# Patient Record
Sex: FEMALE | Race: WHITE | NOT HISPANIC OR LATINO | URBAN - NONMETROPOLITAN AREA
[De-identification: names, ages, dates, MRNs, and addresses within clinical notes are randomized per-mention and may not be internally consistent; named-entity substitution may affect disease eponyms.]

---

## 2017-03-21 PROBLEM — E10.3293: Noted: 2017-03-21

## 2019-01-14 ENCOUNTER — IMPORTED ENCOUNTER (OUTPATIENT)
Dept: URBAN - NONMETROPOLITAN AREA CLINIC 1 | Facility: CLINIC | Age: 34
End: 2019-01-14

## 2019-01-14 PROCEDURE — S0621 ROUTINE OPHTHALMOLOGICAL EXA: HCPCS

## 2019-01-14 NOTE — PATIENT DISCUSSION
IDDM x 21 years - moderate BDR. Stressed importance of good control and streseed importance of seeing PCP. Letter. 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

## 2019-01-17 NOTE — PATIENT DISCUSSION
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 Will plan for pneumatic retinpexy with C3f8. Patient understands 50% of needing further surgery.  Risks and benefits  Hand motion @ 5:57 PM.

## 2019-01-17 NOTE — PROCEDURE NOTE: CLINICAL
PROCEDURE NOTE: Pneumatic Retinopexy OS. Prior to procedure, risks/benefits/alternatives discussed including infection, loss of vision, hemorrhage, cataract, glaucoma, retinal tears or detachment and patient wished to proceed. Betadine prep was performed. Intravitreal injection of 0.3 ml of 100% C3F8 given 4mm from limbus in ST quadrant. Patient tolerated procedure well. There were no complications. Post-op instructions given. Patient given office phone number/answering service number and advised to call immediately should there be an increase in floaters or redness, loss of vision or pain, or should they have any other questions or concerns. Cade Escobar

## 2019-01-17 NOTE — PATIENT DISCUSSION
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 This possibility had been discussed prior to the initial surgery. Options from observation, surgery. pneumatic and second opinion discussed. Risks including not limited to infection, bleeding, glaucoma, cataract, IOL damage, PVR, retinal scars, multiple surgeries, chronic pain, cosmetic changes, decreased ability to perform daily activities causing personal/professional damage, loss of vision/eye discussed. Recommended pneumatic to slow the progression of RD and possibly resolve RD. The possibility of multiple surgeries emphasized. Risks of infection, bleeding, tear, detachment, ocular irritations and cosmetic changes among others discussed. Thorough hygiene and antibiotic use discussed. Call for any changes. Required head positioned discussed.

## 2019-01-18 NOTE — PROCEDURE NOTE: CLINICAL
PROCEDURE NOTE: Repair of Retinal Detachment, 1 or More Sessions OS. Diagnosis: Retinal Detachment with Giant Retinal Tear. Prior to laser, risks/benefits/alternatives to laser discussed including loss of vision, decreased peripheral and night vision, need for more laser and/or surgery and patient wished to proceed. A written consent is on file, and the need for today’s laser was discussed and the patient is understanding and wishes to proceed. Laser Lens: *. Wavelength: Argon Green. Spot size: * um. Pulse power: * mW. Number of pulses: *. Patient tolerated procedure well. There were no complications. Post-op instructions given. Patient given office phone number/answering service number and advised to call immediately should there be loss of vision or pain, or should they have any other questions or concerns. Nico Quach

## 2019-01-21 NOTE — PROCEDURE NOTE: CLINICAL
PROCEDURE NOTE: Repair of Retinal Detachment, 1 or More Sessions OS. Diagnosis: Retinal Detachment with Giant Retinal Tear. Prior to laser, risks/benefits/alternatives to laser discussed including loss of vision, decreased peripheral and night vision, need for more laser and/or surgery and patient wished to proceed. A written consent is on file, and the need for today’s laser was discussed and the patient is understanding and wishes to proceed. Laser Lens: SUPER QUAD. Wavelength: Argon Green. Spot size: 200 um. Pulse power: 340 mW. Number of pulses: 30. Patient tolerated procedure well. There were no complications. Post-op instructions given. Patient given office phone number/answering service number and advised to call immediately should there be loss of vision or pain, or should they have any other questions or concerns. Lizeth Guzman

## 2019-01-23 NOTE — PROCEDURE NOTE: CLINICAL
PROCEDURE NOTE: Repair of Retinal Detachment, 1 or More Sessions OS. Diagnosis: Retinal Detachment with Giant Retinal Tear. Prior to laser, risks/benefits/alternatives to laser discussed including loss of vision, decreased peripheral and night vision, need for more laser and/or surgery and patient wished to proceed. A written consent is on file, and the need for today’s laser was discussed and the patient is understanding and wishes to proceed. Laser Lens:  super quad. Wavelength: Argon Green. Spot size: 100 um. Pulse power: 400mW. Number of pulses: 30. Patient tolerated procedure well. There were no complications. Post-op instructions given. Patient given office phone number/answering service number and advised to call immediately should there be loss of vision or pain, or should they have any other questions or concerns. Saad Walters

## 2019-01-23 NOTE — PATIENT DISCUSSION
Recommend additional laser today.  Sample of Durezol given -use tid times 3 days then stop.  Follow up on Friday  morning.

## 2019-01-30 NOTE — PROCEDURE NOTE: CLINICAL
PROCEDURE NOTE: Repair of Retinal Detachment, 1 or More Sessions OS. Diagnosis: Retinal Detachment with Giant Retinal Tear. Prior to laser, risks/benefits/alternatives to laser discussed including loss of vision, decreased peripheral and night vision, need for more laser and/or surgery and patient wished to proceed. A written consent is on file, and the need for today’s laser was discussed and the patient is understanding and wishes to proceed. Laser Lens: superquad. Wavelength: Argon Green. Spot size: 100 um. Pulse power: 340 mW. Number of pulses: 30. Patient tolerated procedure well. There were no complications. Post-op instructions given. Patient given office phone number/answering service number and advised to call immediately should there be loss of vision or pain, or should they have any other questions or concerns. Anthony Belle

## 2019-02-04 NOTE — PATIENT DISCUSSION
10% gas fill today. Pt to continue sleeping on right side. No exercising for one more week. Continue Durezol tid until runs out. Then start Pred.

## 2019-02-18 NOTE — PATIENT DISCUSSION
5% gas fill today.  Continue and taper pred tid for 2 weeks bid for 2 weeks qday for 2 wks, then stop. No new tears seen on examination today.

## 2019-03-04 NOTE — PATIENT DISCUSSION
Continue Maxitrol QID until it runs out then use PRED qid. Keep head down for 4 more days then pt can sleep on right side, continue patch at night for 4 more days. May return to work in one week.

## 2019-03-18 NOTE — PATIENT DISCUSSION
Patient advised scar tissue present, this needs to be removed.  Will refer patient back to Dr. Savana Cruz for further surgery to remove this.

## 2019-03-18 NOTE — PATIENT DISCUSSION
Recurrent inferior RD with PVR, will need recurrent PPV/MP and possible silicone OIL. Will have patient see Dr Timo Mcghee.

## 2019-04-30 NOTE — PATIENT DISCUSSION
Recurrent inferior RD with PVR, will need recurrent PPV/MP and possible silicone OIL. Will have patient see Dr Vivienne Randolph.

## 2019-04-30 NOTE — PATIENT DISCUSSION
Patient advised scar tissue present, this needs to be removed.  Will refer patient back to Dr. Jake Figueroa for further surgery to remove this.

## 2019-05-13 NOTE — PATIENT DISCUSSION
"  ----- Message -----     From: Lisbeth Contreras     Sent: 3/21/2017  11:36 AM       To: [facility] Clinical Support Staff    Asking for a call back at [phone], regarding names for caregiver    SW received above message and contacted pt back. Pt states has a few people who are willing to alternate care for pt. Upon SW asking if persons are able to take off of work for a certain length of time pt stated "I don't know." Pt states to not having a detailed idea of how caregivers would coordinate and states has yet to speak with all caregivers.     SW recommended to pt that she formulate a detailed plan of caregiver names and days or lengths of time they believe each of them will be able to get off of work.    Pt states will gather a meeting with everyone and bring a calendar in order to formulate a plan for the three months post transplant.     Pt states mother is still primary caregiver. Pt still needing caregiver plan in place. SW remains available.   " Recommended artificial tears to use: 1 drop 4x a day in both eyes.

## 2019-05-13 NOTE — PATIENT DISCUSSION
Significantly increased over past two weeks, will need PPV/MP, will have pt F/U with Dr. Amaury Georges.

## 2019-09-09 NOTE — PATIENT DISCUSSION
Significantly increased over past two weeks, will need PPV/MP, will have pt F/U with Dr. Josefa Guaman.

## 2019-10-01 NOTE — PATIENT DISCUSSION
Significantly increased over past two weeks, will need PPV/MP, will have pt F/U with Dr. Bisi Canales.

## 2019-12-03 NOTE — PATIENT DISCUSSION
Significantly increased over past two weeks, will need PPV/MP, will have pt F/U with Dr. Gabriel Márquez.

## 2019-12-16 NOTE — PATIENT DISCUSSION
Patient educated they are not a candidate for [facility] because of the previous retina surgeries and the wrinkle on the retina.

## 2019-12-16 NOTE — PATIENT DISCUSSION
Lysis of synechiae with surgery. Explained to the patient that this may be the cause of the changes to his pupil. Pupil will hopefully become more stable after surgery.

## 2019-12-16 NOTE — PATIENT DISCUSSION
The patient was informed that with [address] for distance, they will need glasses for all near and intermediate activities after surgery. The patient understands there is a possibility they may need an enhancement after surgery. The patient understands that [address] will give him his personal best vision in the distance but that still will not be perfect. We won't fully know what the eye is capable of until after surgery. The patient elects [facility] OS, goal of emmetropia.

## 2019-12-16 NOTE — PATIENT DISCUSSION
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 Slow taper of PredForte after surgery in order to minimize CME. Patient understands vision will still be limited after cataract surgery.

## 2019-12-16 NOTE — PATIENT DISCUSSION
Patient understands this may be related to the previous retina surgery.  Optic nerve appears normal.

## 2019-12-16 NOTE — PATIENT DISCUSSION
Diamox with cataract surgery. 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

## 2020-01-15 NOTE — PATIENT DISCUSSION
will not taper PMN past BID at RTC with KMS next visit will likely change over to PA and slow taper.  watch IOP possible Hx of steroid response.  will stay ON Cgan BID OS at this point.

## 2020-02-05 NOTE — PATIENT DISCUSSION
2/5/20 may have small amount of cystic spaces in parafoveal region but [facility] better vs OCT from 12/2019.  CPM and re-check in one month if CME any worse will have pt see retina for further evaluation.  will take Alrex and Ilevro QD OS until RTC once PMN is gone.  may want to cont to Tx with topicals around YAG period as well.

## 2020-02-05 NOTE — PATIENT DISCUSSION
Discussed the risks/benefits of YAG Laser Capsulotomy.  ed will need this after we check next month for retinal stability.  Ed MAY improve VA OS slightly but only to to level retina will allow (pt understands this).

## 2020-02-05 NOTE — PATIENT DISCUSSION
pt wishes keep care at [address] with KMS due to distance to [facility].

## 2020-03-04 NOTE — PATIENT DISCUSSION
pt wishes keep care at [address] [facility]  with KMS due to distance to [facility].

## 2020-03-04 NOTE — PATIENT DISCUSSION
2/5/20 may have small amount of cystic spaces in parafoveal region but [facility] better vs OCT from 12/2019.  CPM and re-check in one month if CME any worse will have pt see retina for further evaluation.  will take Alrex and Ilevro QD OS until RTC once PMN is gone.  may want to cont to Tx with topicals around YAG period as well.

## 2020-03-04 NOTE — PATIENT DISCUSSION
3/4/20: temvidal JEROME Cgan.  OS inflammed residual need to Tx inflammation with PA QID so IOP comes back up.  will need YAG once inflammation stable.

## 2020-03-13 NOTE — PATIENT DISCUSSION
pt wishes keep care at [address] [facility]  with KMS due to distance to [facility].

## 2020-03-13 NOTE — PATIENT DISCUSSION
2/5/20 may have small amount of cystic spaces in parafoveal region but [facility] better vs OCT from 12/2019.  CPM and re-check in one month if CME any worse will have pt see retina for further evaluation.  will take Alrex and Ilevro QD OS until RTC once PMN is gone.  may want to cont to Tx with topicals around YAG period as well.

## 2020-03-26 NOTE — PATIENT DISCUSSION
pt wishes keep care at [address] [facility]  with KMS due to distance to [facility].

## 2020-03-26 NOTE — PATIENT DISCUSSION
3/26/2020: stable but for now don't dec PA below BID per KMS as I want to keep inflammation down through getting YAG OS completed.  Also cont with Ilevro QD until gone. add GenTeal gel at bed.

## 2020-03-26 NOTE — PATIENT DISCUSSION
2/5/20 may have small amount of cystic spaces in parafoveal region but [facility] better vs OCT from 12/2019.  CPM and re-check in one month if CME any worse will have pt see retina for further evaluation.  will take Alrex and Ilevro QD OS until RTC once PMN is gone.  may want to cont to Tx with topicals around YAG period as well.

## 2020-05-12 NOTE — PATIENT DISCUSSION
pt wishes keep care at [address] [facility]  with KMS due to distance to [facility].

## 2020-05-12 NOTE — PATIENT DISCUSSION
2/5/20 may have small amount of cystic spaces in parafoveal region but [facility] better vs OCT from 12/2019.  CPM and re-check in one month if CME any worse will have pt see retina for further evaluation.  will take Alrex and Ilevro QD OS until RTC once PMN is gone.  may want to cont to Tx with topicals around YAG period as well.

## 2020-05-26 NOTE — PROCEDURE NOTE: SURGICAL
<p>Prior to commencing surgery patient identification, surgical procedure, site, and side were confirmed by Dr. Khushbu Lopez. Following topical proparacaine anesthesia, the patient was positioned at the YAG laser, a contact lens coupled to the cornea with methylcellulose and an axial posterior capsulotomy performed without complication using 3.8 Mj x 19. Excess methylcellulose was washed from the eye, one drop of Alphagan was instilled and the patient returned to the holding area having tolerated the procedure well and without complication. </p><p>MRN#[id number]</p>

## 2020-05-26 NOTE — PATIENT DISCUSSION
pt wishes keep care at [address] [facility]  with KMS due to distance to [facility].

## 2020-05-26 NOTE — PATIENT DISCUSSION
pt wishes keep care at [address] [facility]  with KMS due to distance to [facility].

## 2020-06-01 NOTE — PATIENT DISCUSSION
pt wishes keep care at [address] [facility]  with KMS due to distance to [facility].

## 2020-06-01 NOTE — PATIENT DISCUSSION
Well healed.  mild AC inflammation.  increase PA QID for 4 days then resume BID until RTC. cont Ilevro QD sample given.

## 2020-06-25 ENCOUNTER — IMPORTED ENCOUNTER (OUTPATIENT)
Dept: URBAN - NONMETROPOLITAN AREA CLINIC 1 | Facility: CLINIC | Age: 35
End: 2020-06-25

## 2020-06-25 PROBLEM — E10.3211: Noted: 2020-06-25

## 2020-06-25 PROBLEM — E10.3313: Noted: 2020-06-25

## 2020-06-25 PROCEDURE — S0621 ROUTINE OPHTHALMOLOGICAL EXA: HCPCS

## 2020-06-25 NOTE — PATIENT DISCUSSION
DM w/moderate DR with mild mac edema-Stressed the importance of keeping blood sugars under control blood pressure under control and weight normalization and regular visits with PCP. -Explained the possible effects of poorly controlled diabetes and the damage that diabetes can cause to ocular health. -Patient to check HgbA1C.-Pt instructed to contact our office with any vision changes. 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

## 2020-06-26 NOTE — PATIENT DISCUSSION
pt wishes keep care at [address] with KMS due to distance to [facility].

## 2020-07-22 ENCOUNTER — IMPORTED ENCOUNTER (OUTPATIENT)
Dept: URBAN - NONMETROPOLITAN AREA CLINIC 1 | Facility: CLINIC | Age: 35
End: 2020-07-22

## 2020-07-22 PROCEDURE — 92134 CPTRZ OPH DX IMG PST SGM RTA: CPT

## 2020-07-22 NOTE — PATIENT DISCUSSION
DM w/moderate DR with mild mac edema OD-Stressed the importance of keeping blood sugars under control blood pressure under control and weight normalization and regular visits with PCP. -Explained the possible effects of poorly controlled diabetes and the damage that diabetes can cause to ocular health. -Patient to check HgbA1C.-Pt instructed to contact our office with any vision changes. REFER TO DR. 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

## 2020-08-07 NOTE — PATIENT DISCUSSION
IOP stable without glc meds.  inflammation well controlled internally at this time but has surface inflammation that is likely due to SIG dryness.   try Cequa BID OS for 40 days to see if help with inflammation and comfort.  Pt to call if is helping will Rx for him.   Patient understands condition, prognosis and need for follow up care.

## 2020-08-07 NOTE — PATIENT DISCUSSION
pt wishes keep care at [address] [facility]  with KMS due to distance to [facility].

## 2020-09-08 NOTE — PATIENT DISCUSSION
CTL monoV fit today with daily CTL OS ed want to reduce discomfort issues since this eye will likely continue to be more irritable than OD.  Also use lotemax SM QD OS.

## 2020-09-08 NOTE — PATIENT DISCUSSION
pt wishes keep care at [address] [facility]  with KMS due to distance to [facility].

## 2020-11-10 NOTE — PATIENT DISCUSSION
pt wishes keep care at [address] [facility]  with KMS due to distance to [facility].

## 2020-11-10 NOTE — PATIENT DISCUSSION
11/10/2020:  Rx Kenneth Councilman will increase to BID OS to keep OS less reactive.  may spot treat with lotemax OS if inflammed.  is using [place] some when desired.

## 2021-03-16 NOTE — PATIENT DISCUSSION
pt wishes keep care at [address] [facility]  with KMS due to distance to [facility].

## 2021-03-16 NOTE — PATIENT DISCUSSION
11/10/2020:  Rx Cynthia Beavers will increase to BID OS to keep OS less reactive.  may spot treat with lotemax OS if inflammed.  is using [place] some when desired.

## 2021-03-25 ENCOUNTER — IMPORTED ENCOUNTER (OUTPATIENT)
Dept: URBAN - NONMETROPOLITAN AREA CLINIC 1 | Facility: CLINIC | Age: 36
End: 2021-03-25

## 2021-03-25 PROCEDURE — 92134 CPTRZ OPH DX IMG PST SGM RTA: CPT

## 2021-03-25 PROCEDURE — 92014 COMPRE OPH EXAM EST PT 1/>: CPT

## 2021-03-25 NOTE — PATIENT DISCUSSION
DM w/moderate DR with mild mac edema OD-Stressed the importance of keeping blood sugars under control blood pressure under control and weight normalization and regular visits with PCP. -Explained the possible effects of poorly controlled diabetes and the damage that diabetes can cause to ocular health. -Patient to check HgbA1C.-Pt instructed to contact our office with any vision changes. s/p focal injection therapy oukeep appt with dr. 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

## 2021-04-29 NOTE — PATIENT DISCUSSION
CBC and CXR are resulted.  Please review and advise if patient can proceed with surgery.  Thank you.   Glasses Rx given.

## 2021-07-07 NOTE — PATIENT DISCUSSION
11/10/2020:  Rx [facility] Bonifacius Hailstone will increase to BID OS to keep OS less reactive.  may spot treat with lotemax OS if inflammed.  is using [place] some when desired.

## 2021-07-07 NOTE — PATIENT DISCUSSION
7/7/2021:  re-start lotemax BID for one month then QD OS for one month and will likely need to use ongoing to suppress inflammation OS and help with chronic KCS.

## 2021-07-07 NOTE — PATIENT DISCUSSION
pt wishes keep care at [address] [facility]  with KMS due to distance to [facility].

## 2021-07-28 NOTE — PATIENT DISCUSSION
11/10/2020:  Rx Carola Starring will increase to BID OS to keep OS less reactive.  may spot treat with lotemax OS if inflammed.  is using [place] some when desired.

## 2021-07-28 NOTE — PATIENT DISCUSSION
7/28/21:. OS looks great today but IOP is up OS. add IOP lowering med Cosopt PF BID OS and cut lotemax to QD OS for one week and then DC.

## 2021-07-28 NOTE — PATIENT DISCUSSION
pt wishes keep care at [address] [facility]  with KMS due to distance to [facility].

## 2021-08-09 NOTE — PATIENT DISCUSSION
11/10/2020:  Rx [place] will increase to BID OS to keep OS less reactive.  may spot treat with lotemax OS if inflammed.  is using [place] some when desired.

## 2021-08-09 NOTE — PATIENT DISCUSSION
pt wishes keep care at [address] [facility]  with KMS due to distance to [facility].

## 2021-09-20 NOTE — PATIENT DISCUSSION
pt wishes keep care at [address] [facility]  with KMS due to distance to [facility].

## 2021-11-15 NOTE — PATIENT DISCUSSION
Discussed the risks/benefits of YAG Laser Capsulotomy.  ed will need this after we check next month for retinal stability.  Ed MAY improve VA OS slightly but only to to level retina will allow (pt understands this). Methotrexate Counseling:  Patient counseled regarding adverse effects of methotrexate including but not limited to nausea, vomiting, abnormalities in liver function tests. Patients may develop mouth sores, rash, diarrhea, and abnormalities in blood counts. The patient understands that monitoring is required including LFT's and blood counts.  There is a rare possibility of scarring of the liver and lung problems that can occur when taking methotrexate. Persistent nausea, loss of appetite, pale stools, dark urine, cough, and shortness of breath should be reported immediately. Patient advised to discontinue methotrexate treatment at least three months before attempting to become pregnant.  I discussed the need for folate supplements while taking methotrexate.  These supplements can decrease side effects during methotrexate treatment. The patient verbalized understanding of the proper use and possible adverse effects of methotrexate.  All of the patient's questions and concerns were addressed.

## 2021-12-06 NOTE — PATIENT DISCUSSION
pt wishes keep care at [address] [facility]  with KMS due to distance to [facility].

## 2021-12-06 NOTE — PATIENT DISCUSSION
12/6/2021:. IOP up a bit today.  needs baseline HVF in near future.  If IOP remains high we may need to Tx IOP chronically (consider HVF results).

## 2022-02-07 NOTE — PATIENT DISCUSSION
pt wishes keep care at [address] [facility]  with KMS due to distance to [facility].

## 2022-02-07 NOTE — PATIENT DISCUSSION
11/10/2020:  Rx Aletta Chad will increase to BID OS to keep OS less reactive.  may spot treat with lotemax OS if inflammed.  is using [place] some when desired.

## 2022-02-07 NOTE — PATIENT DISCUSSION
2/7/22:. IOP much too HIGH today needs lower IOP.   re-start Cosopt PF BID OS.  add [place] BID OS.  check IOP in one week.  want IOP under 20 ALL visits.

## 2022-02-14 NOTE — PATIENT DISCUSSION
2/14/22: IOP much better.  CPM at this time keep taking Cosopt BID OS and [place] BID OS.  want IOP under 20 all visits.

## 2022-02-14 NOTE — PATIENT DISCUSSION
pt wishes keep care at [address] [facility]  with KMS due to distance to [facility].

## 2022-03-28 NOTE — PATIENT DISCUSSION
pt wishes keep care at [address] [facility]  with KMS due to distance to [facility].

## 2022-03-28 NOTE — PATIENT DISCUSSION
11/10/2020:  Rx Areli Watkins will increase to BID OS to keep OS less reactive.  may spot treat with lotemax OS if inflammed.  is using [place] some when desired.

## 2022-04-09 ASSESSMENT — VISUAL ACUITY
OS_SC: 20/30+
OS_SC: 20/40
OS_SC: 20/30
OD_SC: 20/50+
OD_SC: 20/80
OU_CC: J1+
OS_SC: 20/100
OD_SC: 20/30+2
OD_SC: 20/60

## 2022-04-20 ENCOUNTER — ESTABLISHED PATIENT (OUTPATIENT)
Dept: RURAL CLINIC 1 | Facility: CLINIC | Age: 37
End: 2022-04-20

## 2022-04-20 DIAGNOSIS — E10.3292: ICD-10-CM

## 2022-04-20 DIAGNOSIS — E10.3211: ICD-10-CM

## 2022-04-20 PROCEDURE — 92014 COMPRE OPH EXAM EST PT 1/>: CPT

## 2022-04-20 ASSESSMENT — VISUAL ACUITY
OD_CC: 20/40
OS_CC: 20/25-3
OD_PH: 20/30-2

## 2022-04-20 NOTE — PATIENT DISCUSSION
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 Stressed the importance of strong glucose control. Advised the importance of at least yearly dilated examinations, but to contact us immediately for any problems or concerns.

## 2022-06-06 NOTE — PATIENT DISCUSSION
6/6/2022:  started Vital tears about one month ago feels they are helping some thus far but today K is VERY dry centrally OS.

## 2022-06-06 NOTE — PATIENT DISCUSSION
pt wishes keep care at [address] [facility]  with KMS due to distance to [facility].

## 2022-06-16 NOTE — PATIENT DISCUSSION
Central Prior Authorization Team   Phone: [phone]      Prior Authorization Approval    Authorization Effective Date: 5/17/2022  Authorization Expiration Date: 6/15/1925  Medication: Restasis multidose 0.05% emul--APPROVED  Approved Dose/Quantity:    Reference #:     Insurance Company: [employer] - Phone [phone] Fax [phone]  Expected CoPay:       CoPay Card Available:      Foundation Assistance Needed:    Which Pharmacy is filling the prescription (Not needed for infusion/clinic administered): [facility] - [place], [place] - [address]  Pharmacy Notified: Yes  Patient Notified: Yes PHARMACY WILL CONTACT WHEN FILLED       S/P 6 weeks pneumatic, superior area attached, inferior detached mac off. See #1.

## 2022-07-15 NOTE — PATIENT DISCUSSION
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 Discussed option of Oxervate for long term care to help improve vision. Patient informed this will require strict compliance and this is a commitment. This drop will burn/sting when inserted, and will continue to burn as eye heals and nerves return to cornea. Short term Tx will be Cequa BID (start today, Restasis if not covered), continue serum tears at least QID OS, and PFATs, and ointment at nighttime. He likes to wear contact lens- could consider scleral lens OS putting serum tears in the vault, this works well for severe ocular surface disease. We will send in Rx today for Oxervate and discussed that the process takes a few weeks. Also will give sample of tyrvaya which helps with stage 1 NK- patient will let us know if it helps.

## 2022-07-15 NOTE — PATIENT DISCUSSION
11/10/2020:  Rx [place] will increase to BID OS to keep OS less reactive.  may spot treat with lotemax OS if inflammed.  is using [place] some when desired.

## 2022-07-15 NOTE — PATIENT DISCUSSION
Patient was supposed to have started physical therapy after his office visit.  Again, I am happy to see him back in office if he is still having severe pain and wants to discuss proceeding with possible surgery.  Did the injection helped at all?   Well lasered tear inferior and superiorly .

## 2022-07-15 NOTE — PATIENT DISCUSSION
7/15/22: Secondary to severe NK.  Continue serum tears, add cyclosporine, continue PFATs, add lubricating ointment qhs.

## 2022-07-19 NOTE — PATIENT DISCUSSION
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 Restart Lotemax Q1h until seen by retina.

## 2022-07-19 NOTE — PATIENT DISCUSSION
11/10/2020:  Rx Sheila Do will increase to BID OS to keep OS less reactive.  may spot treat with lotemax OS if inflammed.  is using [place] some when desired.

## 2022-07-19 NOTE — PATIENT DISCUSSION
New onset vit heme, no visualization of posterior segment OS. Unable to capture OCT, poor capture of fundus photo.

## 2022-07-19 NOTE — PATIENT DISCUSSION
11/10/2020:  Rx Agus Busby will increase to BID OS to keep OS less reactive.  may spot treat with lotemax OS if inflammed.  is using [place] some when desired.

## 2022-07-19 NOTE — PATIENT DISCUSSION
pt wishes keep care at [address] [facility]  with KMS due to distance to [facility].

## 2022-07-19 NOTE — PATIENT DISCUSSION
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

## 2022-07-19 NOTE — PATIENT DISCUSSION
NO RD seen  on B scan, MAYBE due to IOL CHAFFING on iris  elevated head of bed, no heavy lifting, monitor Follow up with Dr. Nahomy Tovar for second opinion regarding PPV.

## 2022-08-08 NOTE — PATIENT DISCUSSION
11/10/2020:  Rx Tonia Mott will increase to BID OS to keep OS less reactive.  may spot treat with lotemax OS if inflammed.  is using [place] some when desired.

## 2022-08-08 NOTE — PATIENT DISCUSSION
pt wishes keep care at [address] [facility]  with KMS due to distance to [facility].

## 2022-08-11 NOTE — PATIENT DISCUSSION
pt wishes keep care at [address] [facility]  with KMS due to distance to [facility].

## 2022-08-24 NOTE — PATIENT DISCUSSION
pt wishes keep care at [address] [facility]  with KMS due to distance to [facility].

## 2022-09-21 NOTE — PATIENT DISCUSSION
pt wishes keep care at [address] [facility]  with KMS due to distance to [facility].

## 2022-09-21 NOTE — PROCEDURE NOTE: CLINICAL
PROCEDURE NOTE: Avastin () OS. Diagnosis: CME. Anesthesia: 2% Lidocaine. Prep: Betadine Drops and Betadine Scrub. Prior to the original injection, risks/benefits/alternatives discussed including infection, loss of vision, hemorrhage, cataract, glaucoma, retinal tears or detachment. A written consent is on file, and the need for today’s injection was discussed and the patient is understanding and wishes to proceed. The off-label status of Intravitreal Avastin also was reviewed. The patient wished to proceed with treatment. The patient wished to proceed with treatment. Topical anesthetic drops were applied to the eye. Betadine prep was performed. Surgical mask worn. Sterile drape and lid speculum were applied. Using the syringe provided, Avastin 1.25 mg in 0.05 cc was injected into the vitreous cavity. Injection site: 3-4 mm from the limbus. Patient tolerated procedure well. Following the intravitreal injection, the sterile lid speculum was removed. CRA perfusion confirmed. CF vision checked. Patient given office phone number/answering service number and advised to call immediately should there be an increase in floaters or redness, loss of vision or pain, or should they have any other questions or concerns. Morenita Alva

## 2022-11-02 NOTE — PROCEDURE NOTE: CLINICAL
PROCEDURE NOTE: Eylea Sample OS. Diagnosis: CME. Anesthesia: Topical. Prep: Betadine Drops and Betadine Scrub. Prior to injection, risks/benefits/alternatives discussed including corneal abrasion, infection, loss of vision, hemorrhage, cataract, glaucoma, retinal tears or detachment. A written consent is on file, and the need for today’s injection was discussed and the patient is understanding and wishes to proceed. The entire vial of Eylea was drawn up into a syringe. The opened vial, remaining drug, and filtered needle were disposed of in a certified biohazard container. Betadine prep was performed. Topical anesthesia was induced with Alcaine. 4% lidocaine pledge. A lid speculum was used. A short 30g needle on a 1cc syringe was used with product that that had previously been prepared under sterile conditions. Injection site: 3-4 mm from the limbus. The used syringe/needle was transferred to a biohazard container. Lid speculum removed. Mask worn during procedure. Patient tolerated procedure well. Count fingers vision was verified. There were no complications. Patient was given the standard instruction sheet. Patient given office phone number/answering service number and advised to call immediately should there be loss of vision or pain, or should they have any other questions or concerns. Fuad Quarles

## 2022-11-02 NOTE — PATIENT DISCUSSION
Increased CME, worse. Recommended Eylea injection today. Start Prolensa QDAY(sample given) use until gone.

## 2022-11-30 NOTE — PATIENT DISCUSSION
pt wishes keep care at [address] [facility]  with KMS due to distance to [facility].

## 2022-11-30 NOTE — PATIENT DISCUSSION
limiting BCVA, increase Serum to 6x os, patient was to get oxervate previously but has not yet, consider oxervate, refresh P,M at night.

## 2022-12-06 NOTE — PATIENT DISCUSSION
12/6/22: Secondary to severe NK.  Continue serum tears, add cyclosporine, add tyrvaya BID, continue PFATs, add lubricating ointment qhs.

## 2022-12-06 NOTE — PATIENT DISCUSSION
12/6/22: Still significant punctate keratopathy- pt did not get called by [facility]. Will send in prescription again. Start Tyrvaya BID and Cequa BID OS. Continue serum tears 4-6x/day.

## 2022-12-06 NOTE — PATIENT DISCUSSION
pt wishes keep care at [address] with KMS due to distance to [facility].

## 2022-12-06 NOTE — PATIENT DISCUSSION
Avoid ocular irritants which include smoke, paint fumes, moving air, etc. Hopefully long term ocular surface will improve with course of oxervate.

## 2023-01-16 NOTE — PROCEDURE NOTE: CLINICAL
PROCEDURE NOTE: Eylea Sample OS. Diagnosis: CME. Anesthesia: Topical. Prep: Betadine Drops and Betadine Scrub. The patient was identified by the physician. The risks, benefits, alternatives, and possible complications of the procedure were discussed with the patient and informed consent was obtained. The procedure eye was marked with a sticker. The patient was positioned in the chair. A sterile small gauge needle on the medication syringe entered the vitreous cavity 3.0-3.5mm from the limbus and the medication was administered without complication. The speculum was removed. The eye was irrigated with sterile eye rinse solution. The patient was instructed to call immediately for any visual loss, swelling, discharge, or pain after the intravitreal injection. The patient tolerated the procedure well. Vision was checked. Post procedure instructions given. Marisela Rosenthal

## 2023-01-16 NOTE — PATIENT DISCUSSION
Recommended Eylea SAMPLE  injection today. The injection was given and tolerated well by patient. Post-injection instructions were reviewed and understood by the patient.

## 2023-01-16 NOTE — PATIENT DISCUSSION
pt wishes keep care at [address] [facility]  with KMS due to distance to [facility].

## 2023-07-28 NOTE — PATIENT DISCUSSION
Discussed condition and exacerbating conditions/situations (e.g., dry/arid environments, overhead fans, air conditioners, side effect of medications). [FreeTextEntry1] : Reviewed on July 28, 2023.  ER note/cardiology H&P/EKG/labs/chest x-ray/echocardiogram were all reviewed from June 2, 2023 hospital admission at [facility].  Labs had shown stable CBC.  CMP with creatinine 0.8 potassium 4.7 sodium 141 troponin T were negative N-terminal proBNP was normal LDL cholesterol was around 92.

## 2023-09-16 NOTE — PATIENT DISCUSSION
Pt c/o urinary frequency/urgency x 5 days.  Denies abdominal pain  fever or chills Retinal detachment warnings given.

## 2023-11-27 ENCOUNTER — ESTABLISHED PATIENT (OUTPATIENT)
Dept: RURAL CLINIC 1 | Facility: CLINIC | Age: 38
End: 2023-11-27

## 2023-11-27 DIAGNOSIS — H52.13: ICD-10-CM

## 2023-11-27 DIAGNOSIS — E10.3211: ICD-10-CM

## 2023-11-27 DIAGNOSIS — E10.3292: ICD-10-CM

## 2023-11-27 PROCEDURE — 92014 COMPRE OPH EXAM EST PT 1/>: CPT

## 2023-11-27 PROCEDURE — 92015 DETERMINE REFRACTIVE STATE: CPT

## 2023-11-27 ASSESSMENT — VISUAL ACUITY
OD_CC: 20/30-1
OS_CC: 20/40

## 2024-11-02 NOTE — PATIENT DISCUSSION
Mac off RD, SRF extending through inferior macula, inferior 5 clock hours detached break at 530. 3 = A little assistance

## 2024-12-09 NOTE — PATIENT DISCUSSION
"Mother states, \"He has been complaining of joint pain and his joints popping in and out of joint for a while but they are hurting more the last 2 weeks.   He hasn't said they are swollen or red.     I'd like him to be seen. \"    Pt is also due for well.   Scheduled 12/11/24 1300 45 min  " Recommended artificial tears to use: 1 drop 4x a day in both eyes.

## 2024-12-31 ENCOUNTER — COMPREHENSIVE EXAM (OUTPATIENT)
Age: 39
End: 2024-12-31

## 2024-12-31 DIAGNOSIS — E10.3211: ICD-10-CM

## 2024-12-31 DIAGNOSIS — E10.3292: ICD-10-CM

## 2024-12-31 DIAGNOSIS — H52.13: ICD-10-CM

## 2024-12-31 PROCEDURE — 92014 COMPRE OPH EXAM EST PT 1/>: CPT

## 2024-12-31 PROCEDURE — 92015 DETERMINE REFRACTIVE STATE: CPT

## 2025-01-15 NOTE — PATIENT DISCUSSION
IOP stable without glc meds.  inflammation well controlled internally at this time but has surface inflammation that is likely due to SIG dryness.   try Cequa BID OS for 40 days to see if help with inflammation and comfort.  Pt to call if is helping will Rx for him.   Patient understands condition, prognosis and need for follow up care. None